# Patient Record
(demographics unavailable — no encounter records)

---

## 2024-10-23 NOTE — REASON FOR VISIT
[Follow-Up Visit] : a follow-up visit for [FreeTextEntry2] : Elevated hemoglobin elevated hematocrit elevated ferritin

## 2024-10-23 NOTE — REVIEW OF SYSTEMS
Patient called stating he had lab work done and he never received a phone call about the results.  He would like to have NP [place] call him back when he has a chance to discuss them.    Call back number is [phone]   [Recent Change In Weight] : ~T recent weight change [Vision Problems] : vision problems [Negative] : Allergic/Immunologic [Fever] : no fever [Chills] : no chills [Night Sweats] : no night sweats [Fatigue] : no fatigue [Eye Pain] : no eye pain [Hoarseness] : no hoarseness [Chest Pain] : no chest pain [Palpitations] : no palpitations [Lower Ext Edema] : no lower extremity edema [Shortness Of Breath] : no shortness of breath [Cough] : no cough [SOB on Exertion] : no shortness of breath during exertion [Abdominal Pain] : no abdominal pain [Dysuria] : no dysuria [Joint Pain] : no joint pain [Joint Stiffness] : no joint stiffness [Skin Rash] : no skin rash [Dizziness] : no dizziness [Easy Bleeding] : no tendency for easy bleeding [Easy Bruising] : no tendency for easy bruising [FreeTextEntry2] : lost 20 pounds in over a year on Mounjaro [FreeTextEntry3] : Right eye cataract  [de-identified] : Has itching after shower

## 2024-10-23 NOTE — ASSESSMENT
[FreeTextEntry1] : Patient is a 66 year old female with a history of asthma, diabetes, hypertension, and hyperlipidemia, who now presents for follow up of elevated hemoglobin and hematocrit. Patient's polycythemia is likely secondary to chronic pulmonary issues and volume contraction, as she has tested negative for specific mutations seen in myeloproliferative disorders.  Most recently, hemoglobin and hematocrit have been normal. Have ordered B12 and folate, B2MG, CBC with auto differential, CMP, CRP, Erythropoietin level, ferritin, haptoglobin, iron panel, LDH, manual differential, reticulocyte count, sed rat and uric acid. Venipuncture was performed today at the office. Patient was advised to call office to discuss results and next appointment date.

## 2024-10-23 NOTE — HISTORY OF PRESENT ILLNESS
[de-identified] : Patient is a 66 year old female with a history of asthma, diabetes, hypertension, and hyperlipidemia, who now presents for follow up of elevated hemoglobin and hematocrit. Patient has been aware of intermittent elevations of hemoglobin and hematocrit since the 1990s. A CAT scan of the chest in 2010 cited a normal upper abdomen with no mention of any splenomegaly. Patient was last seen in March of 2023, at which time the CBC was only significant for a hematocrit of 45.6 with a normal white blood count, differential,  hemoglobin and platelet count.  Ferritin, iron panel and Erythropoietin level were normal. A full evaluation for myeloproliferative disorder prior to this was negative. A CT heart calcium score performed in January was 57. In February of 2024, patient had a normal CBC with a hemoglobin of 14.8, hematocrit of 43.4 with a normal white blood count, platelet count and differential. Patient states she tested negative for sleep apnea (4-5 years ago). She has lost 20 pounds within one year since being on Mounjaro. Today, the patient complains of vision problems (right eye cataract) and itching after a shower but is otherwise feeling generally well. Denies headaches, dizziness, shortness of breath, fever, chills, drenching night sweats or recent infections. Of note, patient will receive the flu vaccine and RSV vaccine in the near future.

## 2025-01-07 NOTE — HISTORY OF PRESENT ILLNESS
[FreeTextEntry1] : 65 y/o female with h/o cad, htn, type 2 dm, asthma, hl, overweight who presents for f/up today    last seen     no cp, sob, syncope, lh, edema, orthopnea, pnd, palpitations  -Calcium score : 57 (75%)  on mounjaro  seen by heme for elevated hgb felt to be 2/2 asthma  sees endo  sees pulm for asthma   -Echo :  ef 65%, no wma, trace mr/tr/pr  -Holter 5 days: avg 69, SR, 61 pvc's, 5 pac's, no pauses, no afib   drinks green tea twice daily  no increased stress   does walking   DM diagnosed   HTN and HL diagnosed since    Last hospitalized  and . Last steroids Oct 2016.   seen by heme evaluating for elevated hemoglobin and hematocrit. Possible etiologies include secondary polycythemia due to hypoxia, in a patient with chronic pulmonary issues, sleep apnea, dehydration, or polycythemia secondary to erythropoietin secreting fibroids or other cause. A myeloproliferative disorder is less likely. Have advised that the patient inquire with her PCP, Dr. Kenisha Gresham, regarding completing a sleep study. Have discussed DVT precautions during air travel in detail (wear compression stockings, maintain adequate hydration, and avoid alcohol as well as caffeine on days of lengthy travel). Patient was advised to make a follow up appointment pending sleep study.   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

## 2025-01-07 NOTE — DISCUSSION/SUMMARY
[Patient] : the patient [___ Month(s)] : in [unfilled] month(s) [FreeTextEntry1] : 65 y/o female with h/o cad, htn, type 2 dm, asthma, hl, overweight who presents for f/up today  -Calcium score 2024: 57 (75%)  -ekg ordered today - nsr, normal intervals, no st/t changes  -labs 2024 reviewed, need LpA  -Echo 6/21:  ef 65%, no wma, trace mr/tr/pr  -Holter 5 days: avg 69, SR, 61 pvc's, 5 pac's, no pauses, no afib  -continue asa, statin, bb/hctz  -endo recs, on mounjaro  -f/up with heme for polycythemia  -counseled on cvd risk factors  -f/up 6 months for htn     I have spent 30 minutes reviewing labs, records, tests and discussing cvd risk factors, htn. [EKG obtained to assist in diagnosis and management of assessed problem(s)] : EKG obtained to assist in diagnosis and management of assessed problem(s)

## 2025-01-08 NOTE — HEALTH RISK ASSESSMENT
[0] : 2) Feeling down, depressed, or hopeless: Not at all (0) [PHQ-2 Negative - No further assessment needed] : PHQ-2 Negative - No further assessment needed [Never] : Never [Patient reported mammogram was normal] : Patient reported mammogram was normal [Patient reported colonoscopy was abnormal] : Patient reported colonoscopy was abnormal [Fully functional (bathing, dressing, toileting, transferring, walking, feeding)] : Fully functional (bathing, dressing, toileting, transferring, walking, feeding) [Fully functional (using the telephone, shopping, preparing meals, housekeeping, doing laundry, using] : Fully functional and needs no help or supervision to perform IADLs (using the telephone, shopping, preparing meals, housekeeping, doing laundry, using transportation, managing medications and managing finances) [Reports changes in vision] : Reports changes in vision [With Patient/Caregiver] : , with patient/caregiver [Reviewed no changes] : Reviewed, no changes [I will adhere to the patient's wishes.] : I will adhere to the patient's wishes. [No] : In the past 12 months have you used drugs other than those required for medical reasons? No [No falls in past year] : Patient reported no falls in the past year [[id number]] : 0 [Patient reported bone density results were normal] : Patient reported bone density results were normal [Change in mental status noted] : No change in mental status noted [Reports changes in hearing] : Reports no changes in hearing [Reports changes in dental health] : Reports no changes in dental health [MammogramDate] : 11/24 [BoneDensityDate] : 4/22 [ColonoscopyDate] : 11/22 [ColonoscopyComments] : large polyp found, next due Nov 2025. She will call Dr. Coronado's office to schedule [de-identified] : cataract in R eye with some cloudiness of lateral visual field, being monitored [de-identified] : looking for dentist on Medicare [AdvancecareDate] : 1/8/25 [FreeTextEntry4] : will meet with her estate  soon and send us a copy of HCP.

## 2025-01-08 NOTE — HISTORY OF PRESENT ILLNESS
[de-identified] : Still birdwatching, but in the cold weather spending more indoors because the cold air triggers her asthma. Recommended finding an indoor exercise regimen.  A lot of eating over the holidays, gained some weight and A1c up slightly but still under control. LDL at goal on statin. [FreeTextEntry1] : 66 year old woman presenting for AWV

## 2025-01-08 NOTE — REVIEW OF SYSTEMS
[Discharge] : no discharge [Pain] : no pain [Vision Problems] : vision problems [Itching] : no itching [Negative] : Heme/Lymph

## 2025-01-08 NOTE — PHYSICAL EXAM
[No Acute Distress] : no acute distress [Well Nourished] : well nourished [Well Developed] : well developed [Well-Appearing] : well-appearing [Normal Sclera/Conjunctiva] : normal sclera/conjunctiva [Normal Outer Ear/Nose] : the outer ears and nose were normal in appearance [Normal Oropharynx] : the oropharynx was normal [No JVD] : no jugular venous distention [No Lymphadenopathy] : no lymphadenopathy [Supple] : supple [Thyroid Normal, No Nodules] : the thyroid was normal and there were no nodules present [No Respiratory Distress] : no respiratory distress  [No Accessory Muscle Use] : no accessory muscle use [Clear to Auscultation] : lungs were clear to auscultation bilaterally [Normal Rate] : normal rate  [Regular Rhythm] : with a regular rhythm [Normal S1, S2] : normal S1 and S2 [No Murmur] : no murmur heard [No Edema] : there was no peripheral edema [No Extremity Clubbing/Cyanosis] : no extremity clubbing/cyanosis [Soft] : abdomen soft [Non Tender] : non-tender [Non-distended] : non-distended [No Masses] : no abdominal mass palpated [No HSM] : no HSM [Normal Bowel Sounds] : normal bowel sounds [Normal Posterior Cervical Nodes] : no posterior cervical lymphadenopathy [Normal Anterior Cervical Nodes] : no anterior cervical lymphadenopathy [No CVA Tenderness] : no CVA  tenderness [No Spinal Tenderness] : no spinal tenderness [No Joint Swelling] : no joint swelling [Grossly Normal Strength/Tone] : grossly normal strength/tone [No Rash] : no rash [Coordination Grossly Intact] : coordination grossly intact [No Focal Deficits] : no focal deficits [Normal Gait] : normal gait [Normal Affect] : the affect was normal [Alert and Oriented x3] : oriented to person, place, and time [Normal Insight/Judgement] : insight and judgment were intact [de-identified] : minimal amounts of dry cerumen in b/l ear canals

## 2025-03-19 NOTE — PHYSICAL EXAM
[Alert] : alert [Healthy Appearance] : healthy appearance [No Acute Distress] : no acute distress [Normal Sclera/Conjunctiva] : normal sclera/conjunctiva [Normal Hearing] : hearing was normal [No Respiratory Distress] : no respiratory distress [No Stigmata of Cushings Syndrome] : no stigmata of Cushings Syndrome [Normal Gait] : normal gait [Normal Insight/Judgement] : insight and judgment were intact [Kyphosis] : no kyphosis present [Acanthosis Nigricans] : no acanthosis nigricans [de-identified] : no moon facies, no supraclavicular fat pads [de-identified] : Foot examination performed in September 2024

## 2025-03-19 NOTE — ASSESSMENT
[FreeTextEntry1] : Type 2 diabetes mellitus. Point-of-care HbA1c 6.1% and glucose 97 mg/dL today. No known history of micro- or macrovascular complications. I congratulated her on her excellent glycemic control. We have discussed the cardiovascular and microvascular complications of uncontrolled diabetes. We have discussed the importance of diet and exercise and lifestyle modification for glycemic control. We have discussed pharmacologic options for glycemic control. She is tolerating her current regimen and we will continue. Continue Mounjaro 5 mg weekly Monitor blood sugars a few times a week, fasting or postprandial She is on a blood pressure regimen; blood pressure around goal She is on a statin for cholesterol; last lipid panel around goal Nephropathy screening: Urine microalbumin within the normal range in January 2025 Last ophthalmology appointment: May 2024; annual Last dental appointment: Within six months  Screening for osteoporosis. Obtain inteval dual energy X-ray absorptiometry.   Return to see me in 6 months. Patient advised to call earlier with significant hypo- or hyperglycemia.

## 2025-03-19 NOTE — HISTORY OF PRESENT ILLNESS
[FreeTextEntry1] : Ms. Kirkland is a 66 year-old woman presenting for follow-up of type 2 diabetes mellitus. I saw her for an initial visit in July 2021 and last in September 2024. She is a retired nurse from [employer].   Type 2 diabetes mellitus. Point-of-care HbA1c 6.1% and glucose 97 mg/dL today; HbA1c 5.7% in March 2024, 6.8% in February 2024, >14% in December 2023. No known history of micro- or macrovascular complications. She was diagnosed with diabetes at age 50. No hospitalizations for hypo- or hyperglycemia. She had been taking metformin 500 mg daily, stopped around February 2021 due to gastrointestinal issues. She was previously on Januvia, discontinued due to fatigue and indigestion.  In October 2021 we started Rybelsus up to 7 mg daily. She had been off Rybelsus since October 2023 due to issues with insurance coverage.  In December 2023 we started Mounjaro up to 5 mg weekly.  She is currently taking Mounjaro 5 mg weekly.  She is on a blood pressure regimen She is on a statin for cholesterol Nephropathy screening: Urine microalbumin within the normal range in January 2025 Last ophthalmology appointment: May 2024; annual Last dental appointment: Within six months  Interim History  She is currently taking Mounjaro 5 mg weekly.  She has seen multiple providers; notes reviewed.  She feels well today. Weight is up 6 pounds since last visit; weight is overall down 23 pounds from a high of 146 pounds. No chest pain, shortness of breath, lower extremity numbness/tingling. Her diabetic cat passed away. Medical and surgical history, medications, allergies, social and family history reviewed and updated as needed.

## 2025-07-07 NOTE — DISCUSSION/SUMMARY
[Patient] : the patient [___ Month(s)] : in [unfilled] month(s) [FreeTextEntry1] : 65 y/o female with h/o cad, htn, type 2 dm, asthma, hl, overweight who presents for f/up today  -Calcium score 2024: 57 (75%)  -ekg ordered today - nsr, normal intervals, no st/t changes  -labs 2025 reviewed  -LpA wnl  -Echo 6/21:  ef 65%, no wma, trace mr/tr/pr  -Holter 5 days: avg 69, SR, 61 pvc's, 5 pac's, no pauses, no afib  -continue asa, statin, bb/hctz  -endo recs, on mounjaro  -f/up with heme for polycythemia  -counseled on cvd risk factors  -f/up 6 months for htn    I have spent 30 minutes reviewing labs, records, tests and discussing cvd risk factors [EKG obtained to assist in diagnosis and management of assessed problem(s)] : EKG obtained to assist in diagnosis and management of assessed problem(s)

## 2025-07-07 NOTE — HISTORY OF PRESENT ILLNESS
[FreeTextEntry1] : 65 y/o female with h/o cad, htn, type 2 dm, asthma, hl, overweight who presents for f/up today    last seen    no cp, sob, syncope, lh, edema, orthopnea, pnd, palpitations  -Calcium score : 57 (75%)  on mounjaro  seen by heme for elevated hgb felt to be 2/2 asthma  sees endo  sees pulm for asthma   -Echo :  ef 65%, no wma, trace mr/tr/pr  -Holter 5 days: avg 69, SR, 61 pvc's, 5 pac's, no pauses, no afib   drinks green tea twice daily  no increased stress   does walking   DM diagnosed   HTN and HL diagnosed since    Last hospitalized  and . Last steroids Oct 2016.   seen by heme evaluating for elevated hemoglobin and hematocrit. Possible etiologies include secondary polycythemia due to hypoxia, in a patient with chronic pulmonary issues, sleep apnea, dehydration, or polycythemia secondary to erythropoietin secreting fibroids or other cause. A myeloproliferative disorder is less likely. Have advised that the patient inquire with her PCP, Dr. Kenisha Gresham, regarding completing a sleep study. Have discussed DVT precautions during air travel in detail (wear compression stockings, maintain adequate hydration, and avoid alcohol as well as caffeine on days of lengthy travel). Patient was advised to make a follow up appointment pending sleep study.   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

## 2025-07-25 NOTE — HISTORY OF PRESENT ILLNESS
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 Sleeps w her cats.  6/26/2023: Comes in for routine follow up. Has been very well, no problems or concerns over last year. No exacerbations. No dyspnea. Continues on Breo 200 daily.  1/17/2024 [[place]]: Doing well. No respiratory concerns, no exacerbations despite recent + rhinovirus in Oct 2023 did not require steroids or abx. Using Breo 200 daily and continues prn abuterol prior to going out in the cold. Needs refills. Diabetes poorly controlled but getting better. Has not had seasonal vaccines yet.   7/25/2025: Asthma well controlled on Breo 200 without interval exacerbation. Lost 20 lbs on Mounjaro for DM and this has helped her breathing wtih exertion. 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 This turkey lives in [facility] [place].  They bring the turkey food.

## 2025-07-25 NOTE — ASSESSMENT
[FreeTextEntry1] : Data reviewed:  HST 12/2019 [facility]: no SDB, theron sat 91%  Labs 1/2017: eos 3.9%, IgE 403, RAST ++ cat, dog, dust mite  PA/lat CXR in office 1/25/17: no infiltrate PA/lat CXR in office 6/21/17: no infiltrate  [place] 1/25/17: severe EAO w no sig response to IBD [place] 3/20/17: severe EAO, no change [place] 6/21/17: severe EAO, FEV1 47%, no change [place] 1/26/18: severe obstruction, FEV1 55% [place] 5/2/19: severe obstruction, FEV1 52% [place] 4/22/21: severe obstruction, FEV1 53%  Impression: Severe persistent asthma: Asthma/COPD overlap  Plan: She is doing great. Weight loss and increased activity great for her. Continue Breo 200/5 without plan to stepdown as she has historically done less well on medium dose. Vax UTD. Follow annually, sooner for any problems.